# Patient Record
Sex: MALE | Race: WHITE
[De-identification: names, ages, dates, MRNs, and addresses within clinical notes are randomized per-mention and may not be internally consistent; named-entity substitution may affect disease eponyms.]

---

## 2018-12-27 ENCOUNTER — HOSPITAL ENCOUNTER (OUTPATIENT)
Dept: HOSPITAL 39 - MRI | Age: 58
End: 2018-12-27
Attending: NURSE PRACTITIONER
Payer: COMMERCIAL

## 2018-12-27 DIAGNOSIS — S56.511A: Primary | ICD-10-CM

## 2018-12-27 NOTE — MRI
EXAM DESCRIPTION: Elbow,Right



CLINICAL HISTORY: 58 years, Male, FALL WITH RT ELBOW PAIN



COMPARISON: None



TECHNIQUE: MRI of the right elbow was performed with multiplanar

multi sequence imaging according to our usual protocol.



FINDINGS: 

Marked thickening and increased signal intensity of the common

extensor origin lateral humerus. This is especially true of the

articular side fibers with about 50% of the thickness port and

slightly delaminated.

Radial collateral intact. Medial/ulnar collateral and common

flexor intact and normal.

Biceps, triceps, brachialis intact and normal.

No fracture or bone lesion or joint effusion.



IMPRESSION: 

High-grade partial tear common extensor



Electronically signed by:  Gavin Paulson MD  12/27/2018 9:36 AM

CST Workstation: [id number]